# Patient Record
Sex: FEMALE | Race: WHITE | NOT HISPANIC OR LATINO | URBAN - METROPOLITAN AREA
[De-identification: names, ages, dates, MRNs, and addresses within clinical notes are randomized per-mention and may not be internally consistent; named-entity substitution may affect disease eponyms.]

---

## 2020-11-20 ENCOUNTER — TELEPHONE ENCOUNTER (OUTPATIENT)
Dept: URBAN - METROPOLITAN AREA CLINIC 100 | Facility: CLINIC | Age: 35
End: 2020-11-20

## 2020-11-20 RX ORDER — MESALAMINE 1000 MG/1
INSERT ONE SUPPOSITORY RECTALLY ONE TIME DAILY AS DIRECTED SUPPOSITORY RECTAL ONCE A DAY
Qty: 30 | Refills: 2
Start: 2020-05-11 | End: 2020-08-09

## 2021-03-12 ENCOUNTER — ERX REFILL RESPONSE (OUTPATIENT)
Dept: URBAN - METROPOLITAN AREA CLINIC 96 | Facility: CLINIC | Age: 36
End: 2021-03-12

## 2021-03-12 RX ORDER — MESALAMINE 1000 MG/1
_INSERT ONE SUPPOSITORY RECTALLY ONE TIME DAILY AS DIRECTED SUPPOSITORY RECTAL ONCE A DAY
Qty: 30 | Refills: 0

## 2021-04-12 ENCOUNTER — TELEPHONE ENCOUNTER (OUTPATIENT)
Dept: URBAN - METROPOLITAN AREA CLINIC 98 | Facility: CLINIC | Age: 36
End: 2021-04-12

## 2021-04-27 ENCOUNTER — WEB ENCOUNTER (OUTPATIENT)
Dept: URBAN - METROPOLITAN AREA CLINIC 96 | Facility: CLINIC | Age: 36
End: 2021-04-27

## 2021-04-27 ENCOUNTER — OFFICE VISIT (OUTPATIENT)
Dept: URBAN - METROPOLITAN AREA CLINIC 96 | Facility: CLINIC | Age: 36
End: 2021-04-27
Payer: COMMERCIAL

## 2021-04-27 VITALS
DIASTOLIC BLOOD PRESSURE: 75 MMHG | BODY MASS INDEX: 26.98 KG/M2 | SYSTOLIC BLOOD PRESSURE: 125 MMHG | WEIGHT: 158 LBS | HEART RATE: 81 BPM | TEMPERATURE: 97 F | HEIGHT: 64 IN

## 2021-04-27 DIAGNOSIS — R15.2 FECAL URGENCY: ICD-10-CM

## 2021-04-27 DIAGNOSIS — K62.89 PROCTITIS: ICD-10-CM

## 2021-04-27 DIAGNOSIS — R19.7 DIARRHEA, UNSPECIFIED TYPE: ICD-10-CM

## 2021-04-27 PROCEDURE — 99214 OFFICE O/P EST MOD 30 MIN: CPT | Performed by: INTERNAL MEDICINE

## 2021-04-27 RX ORDER — MESALAMINE 1000 MG/1
_INSERT ONE SUPPOSITORY RECTALLY ONE TIME DAILY AS DIRECTED SUPPOSITORY RECTAL ONCE A DAY
Qty: 30 | Refills: 0 | Status: ACTIVE | COMMUNITY

## 2021-04-27 RX ORDER — MESALAMINE 1000 MG/1
1 SUPPOSITORY AT BEDTIME SUPPOSITORY RECTAL ONCE A DAY
Qty: 90 | Refills: 4
End: 2022-07-21

## 2021-04-27 NOTE — HPI-TODAY'S VISIT:
36 yo female previously seen in clinic 2/11/2020. Prior personal hx of proctitis as noted on colonoscopy with Dr. Crawley 1/4/2017, was treated with Canasa with improvement but stopped on her own with recurrent sx. Flex sig with myself 12/21/2017 with proctitis again treated with Canasa which improved her sx.   1 yo son Minesh C section delivery. 9 yo and 7 yo also doing well.   Patient reports had been doing well for the past year with "mild flare up" recently. Had been taking Cansaa daily  then decreased to TIW and had been doing well, but then had recurrent diarrhea and urgency, scant rectal bleeding. No rectal pain.   No recent antibiotics, no med changes, no NSAIDs. No unintentional weight loss. UTD with primary MD.

## 2021-04-28 LAB
A/G RATIO: 1.9
ALBUMIN: 4.3
ALKALINE PHOSPHATASE: 58
ALT (SGPT): 14
AST (SGOT): 22
BASO (ABSOLUTE): 0
BASOS: 1
BILIRUBIN, TOTAL: 0.3
BUN/CREATININE RATIO: 18
BUN: 14
C-REACTIVE PROTEIN, QUANT: 1
CALCIUM: 9.5
CARBON DIOXIDE, TOTAL: 25
CHLORIDE: 101
CREATININE: 0.78
EGFR IF AFRICN AM: 113
EGFR IF NONAFRICN AM: 98
EOS (ABSOLUTE): 0
EOS: 1
GLOBULIN, TOTAL: 2.3
GLUCOSE: 97
HEMATOCRIT: 42.5
HEMATOLOGY COMMENTS:: (no result)
HEMOGLOBIN: 14.2
IMMATURE CELLS: (no result)
IMMATURE GRANS (ABS): 0
IMMATURE GRANULOCYTES: 0
LYMPHS (ABSOLUTE): 1.8
LYMPHS: 34
MCH: 29.3
MCHC: 33.4
MCV: 88
MONOCYTES(ABSOLUTE): 0.2
MONOCYTES: 4
NEUTROPHILS (ABSOLUTE): 3.2
NEUTROPHILS: 60
NRBC: (no result)
PLATELETS: 254
POTASSIUM: 4.6
PROTEIN, TOTAL: 6.6
RBC: 4.85
RDW: 12.2
SODIUM: 138
WBC: 5.2

## 2021-05-14 ENCOUNTER — ERX REFILL RESPONSE (OUTPATIENT)
Dept: URBAN - METROPOLITAN AREA CLINIC 96 | Facility: CLINIC | Age: 36
End: 2021-05-14

## 2021-05-14 RX ORDER — MESALAMINE 1000 MG/1
_INSERT ONE SUPPOSITORY RECTALLY ONE TIME DAILY AS DIRECTED SUPPOSITORY RECTAL
Qty: 30 | Refills: 8

## 2021-05-21 ENCOUNTER — TELEPHONE ENCOUNTER (OUTPATIENT)
Dept: URBAN - METROPOLITAN AREA CLINIC 98 | Facility: CLINIC | Age: 36
End: 2021-05-21

## 2021-11-30 ENCOUNTER — TELEPHONE ENCOUNTER (OUTPATIENT)
Dept: URBAN - METROPOLITAN AREA CLINIC 98 | Facility: CLINIC | Age: 36
End: 2021-11-30

## 2021-12-17 ENCOUNTER — OFFICE VISIT (OUTPATIENT)
Dept: URBAN - METROPOLITAN AREA TELEHEALTH 2 | Facility: TELEHEALTH | Age: 36
End: 2021-12-17
Payer: COMMERCIAL

## 2021-12-17 ENCOUNTER — LAB OUTSIDE AN ENCOUNTER (OUTPATIENT)
Dept: URBAN - METROPOLITAN AREA TELEHEALTH 2 | Facility: TELEHEALTH | Age: 36
End: 2021-12-17

## 2021-12-17 DIAGNOSIS — R15.2 FECAL URGENCY: ICD-10-CM

## 2021-12-17 DIAGNOSIS — K62.89 PROCTITIS: ICD-10-CM

## 2021-12-17 DIAGNOSIS — R19.7 DIARRHEA, UNSPECIFIED TYPE: ICD-10-CM

## 2021-12-17 PROCEDURE — 99213 OFFICE O/P EST LOW 20 MIN: CPT | Performed by: INTERNAL MEDICINE

## 2021-12-17 RX ORDER — MESALAMINE 1000 MG/1
1 SUPPOSITORY AT BEDTIME SUPPOSITORY RECTAL BID
Qty: 60 | Refills: 11

## 2021-12-17 RX ORDER — SODIUM, POTASSIUM,MAG SULFATES 17.5-3.13G
177ML SOLUTION, RECONSTITUTED, ORAL ORAL AS DIRECTED
Qty: 1 | Refills: 0 | OUTPATIENT
Start: 2021-12-17 | End: 2021-12-18

## 2021-12-17 RX ORDER — MESALAMINE 1000 MG/1
_INSERT ONE SUPPOSITORY RECTALLY ONE TIME DAILY AS DIRECTED SUPPOSITORY RECTAL
Qty: 30 | Refills: 8 | Status: ACTIVE | COMMUNITY

## 2021-12-17 NOTE — HPI-TODAY'S VISIT:
Patient's last colon 2017 - proctitis, non bleeding internal hemorrhoids She is having her 3rd flare up ever Her recent symptoms first started right after second vaccine and then again after her booster will get bloating, rectal bleeding Normal bowel habit 1 BM in am  When flares she will have urgency, pressure in her rectum when she would wake up, swelling and feels like it will prolapse - liquid will pass first and then the next stool will be formed - increased bloating Has been using Mesalamine Supp, on week 3 of Prednisone - feeling better - not totally but better She has anxiety that makes it worse - embarressed about these symptoms

## 2022-01-06 ENCOUNTER — WEB ENCOUNTER (OUTPATIENT)
Dept: URBAN - METROPOLITAN AREA CLINIC 96 | Facility: CLINIC | Age: 37
End: 2022-01-06

## 2022-03-25 ENCOUNTER — OFFICE VISIT (OUTPATIENT)
Dept: URBAN - METROPOLITAN AREA SURGERY CENTER 18 | Facility: SURGERY CENTER | Age: 37
End: 2022-03-25

## 2022-05-02 ENCOUNTER — WEB ENCOUNTER (OUTPATIENT)
Dept: URBAN - METROPOLITAN AREA CLINIC 96 | Facility: CLINIC | Age: 37
End: 2022-05-02

## 2022-05-05 ENCOUNTER — CLAIMS CREATED FROM THE CLAIM WINDOW (OUTPATIENT)
Dept: URBAN - METROPOLITAN AREA CLINIC 4 | Facility: CLINIC | Age: 37
End: 2022-05-05
Payer: COMMERCIAL

## 2022-05-05 ENCOUNTER — OFFICE VISIT (OUTPATIENT)
Dept: URBAN - METROPOLITAN AREA SURGERY CENTER 18 | Facility: SURGERY CENTER | Age: 37
End: 2022-05-05
Payer: COMMERCIAL

## 2022-05-05 DIAGNOSIS — K63.89 CYST OF DUODENUM: ICD-10-CM

## 2022-05-05 DIAGNOSIS — K51.011 CHRONIC ULCERATIVE ENTEROCOLITIS WITH RECTAL BLEEDING: ICD-10-CM

## 2022-05-05 DIAGNOSIS — K51.911 ULCERATIVE COLITIS, UNSPECIFIED WITH RECTAL BLEEDING: ICD-10-CM

## 2022-05-05 DIAGNOSIS — D12.5 BENIGN NEOPLASM OF SIGMOID COLON: ICD-10-CM

## 2022-05-05 DIAGNOSIS — D12.5 ADENOMA OF SIGMOID COLON: ICD-10-CM

## 2022-05-05 PROCEDURE — 45380 COLONOSCOPY AND BIOPSY: CPT | Performed by: INTERNAL MEDICINE

## 2022-05-05 PROCEDURE — G8907 PT DOC NO EVENTS ON DISCHARG: HCPCS | Performed by: INTERNAL MEDICINE

## 2022-05-05 PROCEDURE — 88305 TISSUE EXAM BY PATHOLOGIST: CPT | Performed by: PATHOLOGY

## 2022-05-05 RX ORDER — MESALAMINE 1000 MG/1
_INSERT ONE SUPPOSITORY RECTALLY ONE TIME DAILY AS DIRECTED SUPPOSITORY RECTAL
Qty: 30 | Refills: 8 | Status: ACTIVE | COMMUNITY

## 2022-05-05 RX ORDER — MESALAMINE 1000 MG/1
1 SUPPOSITORY AT BEDTIME SUPPOSITORY RECTAL BID
Qty: 60 | Refills: 11 | Status: ACTIVE | COMMUNITY

## 2023-03-03 ENCOUNTER — ERX REFILL RESPONSE (OUTPATIENT)
Dept: URBAN - METROPOLITAN AREA CLINIC 80 | Facility: CLINIC | Age: 38
End: 2023-03-03

## 2023-03-03 RX ORDER — MESALAMINE 1000 MG/1
1 SUPPOSITORY AT BEDTIME SUPPOSITORY RECTAL BID
Qty: 60 | Refills: 11 | OUTPATIENT

## 2023-03-03 RX ORDER — MESALAMINE 1000 MG/1
_INSERT ONE SUPPOSITORY RECTALLY TWICE A DAY SUPPOSITORY RECTAL
Qty: 60 SUPPOSITORIES | Refills: 1 | OUTPATIENT

## 2023-09-01 ENCOUNTER — OFFICE VISIT (OUTPATIENT)
Dept: URBAN - METROPOLITAN AREA TELEHEALTH 2 | Facility: TELEHEALTH | Age: 38
End: 2023-09-01
Payer: COMMERCIAL

## 2023-09-01 ENCOUNTER — DASHBOARD ENCOUNTERS (OUTPATIENT)
Age: 38
End: 2023-09-01

## 2023-09-01 VITALS — HEIGHT: 64 IN | BODY MASS INDEX: 27.31 KG/M2 | WEIGHT: 160 LBS

## 2023-09-01 DIAGNOSIS — K62.89 PROCTITIS: ICD-10-CM

## 2023-09-01 DIAGNOSIS — R15.2 FECAL URGENCY: ICD-10-CM

## 2023-09-01 DIAGNOSIS — R19.7 ACUTE DIARRHEA: ICD-10-CM

## 2023-09-01 DIAGNOSIS — Z86.010 PERSONAL HISTORY OF COLONIC POLYPS: ICD-10-CM

## 2023-09-01 PROBLEM — 428283002: Status: ACTIVE | Noted: 2023-09-01

## 2023-09-01 PROCEDURE — 99213 OFFICE O/P EST LOW 20 MIN: CPT | Performed by: PHYSICIAN ASSISTANT

## 2023-09-01 RX ORDER — MESALAMINE 1000 MG/1
1 SUPPOSITORY AT BEDTIME SUPPOSITORY RECTAL ONCE A DAY
Qty: 30 | Refills: 11

## 2023-09-01 RX ORDER — MESALAMINE 1000 MG/1
_INSERT ONE SUPPOSITORY RECTALLY TWICE A DAY SUPPOSITORY RECTAL
Qty: 60 SUPPOSITORIES | Refills: 1 | Status: ACTIVE | COMMUNITY

## 2023-09-01 RX ORDER — MESALAMINE 1000 MG/1
_INSERT ONE SUPPOSITORY RECTALLY ONE TIME DAILY AS DIRECTED SUPPOSITORY RECTAL
Qty: 30 | Refills: 8 | Status: ACTIVE | COMMUNITY

## 2023-09-01 NOTE — HPI-TODAY'S VISIT:
Pt has hx proctitis  last colon 5/2022 -- 1 tubular adenoma and active proctitis she is due for labs and wants to have her inflammation levels checked she feels like something off and feels like her body is overall inflammed - not just her colon wonders if it is hormone levels, etc - tried making appt for endocrinologist but they want labs first she does the canasa supp twice a week  normal bowel habit is 1 BM a day no BRBPR or melena no urgency

## 2023-09-14 LAB
A/G RATIO: 1.8
ALBUMIN: 4.2
ALKALINE PHOSPHATASE: 41
ALT (SGPT): 14
AST (SGOT): 18
BILIRUBIN, TOTAL: 0.5
BUN/CREATININE RATIO: (no result)
BUN: 17
C-REACTIVE PROTEIN, QUANT: 0.8
CALCIUM: 9.3
CARBON DIOXIDE, TOTAL: 28
CHLORIDE: 104
CHOL/HDLC RATIO: 3.6
CHOLESTEROL, TOTAL: 165
CREATININE: 0.74
EGFR: 106
FERRITIN, SERUM: 12
FOLATE (FOLIC ACID), SERUM: 15.4
GLOBULIN, TOTAL: 2.3
GLUCOSE: 84
HDL CHOLESTEROL: 46
HEMATOCRIT: 43.7
HEMOGLOBIN: 14.2
IRON BIND.CAP.(TIBC): 359
IRON SATURATION: 21
IRON: 74
LDL CHOLESTEROL CALC: 104
MCH: 28.9
MCHC: 32.5
MCV: 88.8
MPV: 11.3
NON HDL CHOLESTEROL: 119
PLATELET COUNT: 224
POTASSIUM: 4.5
PROTEIN, TOTAL: 6.5
RDW: 12.2
RED BLOOD CELL COUNT: 4.92
SODIUM: 139
TRIGLYCERIDES: 61
TSH W/REFLEX TO FT4: 2.38
VITAMIN B12: 305
VITAMIN D,25-OH,TOTAL,IA: 37
WHITE BLOOD CELL COUNT: 4.4

## 2023-09-18 ENCOUNTER — LAB OUTSIDE AN ENCOUNTER (OUTPATIENT)
Dept: URBAN - METROPOLITAN AREA CLINIC 80 | Facility: CLINIC | Age: 38
End: 2023-09-18

## 2023-09-21 LAB — CALPROTECTIN, STOOL - QDX: (no result)

## 2025-02-25 ENCOUNTER — OFFICE VISIT (OUTPATIENT)
Dept: URBAN - METROPOLITAN AREA CLINIC 96 | Facility: CLINIC | Age: 40
End: 2025-02-25

## 2025-02-25 VITALS
SYSTOLIC BLOOD PRESSURE: 136 MMHG | TEMPERATURE: 97.9 F | BODY MASS INDEX: 25.27 KG/M2 | WEIGHT: 148 LBS | HEART RATE: 77 BPM | HEIGHT: 64 IN | DIASTOLIC BLOOD PRESSURE: 82 MMHG

## 2025-02-25 DIAGNOSIS — Z83.79 FAMILY HISTORY OF INFLAMMATORY BOWEL DISEASE: ICD-10-CM

## 2025-02-25 DIAGNOSIS — K62.89 PROCTITIS: ICD-10-CM

## 2025-02-25 DIAGNOSIS — Z86.0101 PERSONAL HISTORY OF ADENOMATOUS AND SERRATED COLON POLYPS: ICD-10-CM

## 2025-02-25 PROBLEM — 3951002: Status: ACTIVE | Noted: 2025-02-25

## 2025-02-25 PROBLEM — 5940001000004108: Status: ACTIVE | Noted: 2025-02-25

## 2025-02-25 PROCEDURE — 99213 OFFICE O/P EST LOW 20 MIN: CPT

## 2025-02-25 RX ORDER — MESALAMINE 1000 MG/1
1 SUPPOSITORY AT BEDTIME SUPPOSITORY RECTAL ONCE A DAY
Qty: 30 | Refills: 11

## 2025-02-25 RX ORDER — MESALAMINE 1000 MG/1
_INSERT ONE SUPPOSITORY RECTALLY ONE TIME DAILY AS DIRECTED SUPPOSITORY RECTAL
Qty: 30 | Refills: 8 | Status: ACTIVE | COMMUNITY

## 2025-02-25 NOTE — HPI-TODAY'S VISIT:
39 year old female presents for medication management for proctitis. Labs in May with PCP -- unremarkable per patient. BMs daily. Denies urgency, abdominal pain, blood in stool, melena, mucus, unexplained weight loss. "Best my gut has felt in a long time." Taking colostrum and eats clean.  Thinks that is helping. Uses Mesalamine suppository once a week. No GI complaints.

## 2025-02-25 NOTE — HPI-OTHER HISTORIES
Previously 9/2023 with KARLEY Ken: Pt has hx proctitis last colon 5/2022 -- 1 tubular adenoma and active proctitis she is due for labs and wants to have her inflammation levels checked she feels like something off and feels like her body is overall inflammed - not just her colon wonders if it is hormone levels, etc - tried making appt for endocrinologist but they want labs first she does the canasa supp twice a week normal bowel habit is 1 BM a day no BRBPR or melena no urgency